# Patient Record
Sex: FEMALE | Race: WHITE
[De-identification: names, ages, dates, MRNs, and addresses within clinical notes are randomized per-mention and may not be internally consistent; named-entity substitution may affect disease eponyms.]

---

## 2018-04-14 ENCOUNTER — HOSPITAL ENCOUNTER (EMERGENCY)
Dept: HOSPITAL 94 - ER | Age: 33
Discharge: HOME | End: 2018-04-14
Payer: MEDICAID

## 2018-04-14 VITALS — WEIGHT: 189.6 LBS | HEIGHT: 63 IN | BODY MASS INDEX: 33.59 KG/M2

## 2018-04-14 VITALS — SYSTOLIC BLOOD PRESSURE: 129 MMHG | DIASTOLIC BLOOD PRESSURE: 79 MMHG
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
## 2018-04-14 DIAGNOSIS — G89.29: ICD-10-CM

## 2018-04-14 DIAGNOSIS — S16.1XXA: Primary | ICD-10-CM

## 2018-04-14 DIAGNOSIS — J45.909: ICD-10-CM
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
## 2018-04-14 DIAGNOSIS — Z88.8: ICD-10-CM

## 2018-04-14 DIAGNOSIS — Z79.899: ICD-10-CM

## 2018-04-14 DIAGNOSIS — Z87.442: ICD-10-CM

## 2018-04-14 DIAGNOSIS — S39.012A: ICD-10-CM

## 2018-04-14 PROCEDURE — 99283 EMERGENCY DEPT VISIT LOW MDM: CPT

## 2018-05-24 ENCOUNTER — HOSPITAL ENCOUNTER (INPATIENT)
Dept: HOSPITAL 94 - ER | Age: 33
LOS: 3 days | Discharge: HOME | DRG: 463 | End: 2018-05-27
Attending: FAMILY MEDICINE | Admitting: FAMILY MEDICINE
Payer: MEDICAID

## 2018-05-24 VITALS — WEIGHT: 200.62 LBS | BODY MASS INDEX: 35.55 KG/M2 | HEIGHT: 63 IN

## 2018-05-24 DIAGNOSIS — J45.909: ICD-10-CM

## 2018-05-24 DIAGNOSIS — M54.9: ICD-10-CM

## 2018-05-24 DIAGNOSIS — R00.0: ICD-10-CM

## 2018-05-24 DIAGNOSIS — B96.20: ICD-10-CM

## 2018-05-24 DIAGNOSIS — Z23: ICD-10-CM

## 2018-05-24 DIAGNOSIS — Z82.0: ICD-10-CM

## 2018-05-24 DIAGNOSIS — K76.0: ICD-10-CM

## 2018-05-24 DIAGNOSIS — E66.9: ICD-10-CM

## 2018-05-24 DIAGNOSIS — Z87.442: ICD-10-CM

## 2018-05-24 DIAGNOSIS — E28.2: ICD-10-CM

## 2018-05-24 DIAGNOSIS — N13.6: Primary | ICD-10-CM

## 2018-05-24 DIAGNOSIS — Z72.0: ICD-10-CM

## 2018-05-24 DIAGNOSIS — G89.29: ICD-10-CM

## 2018-05-24 LAB
ALBUMIN SERPL BCP-MCNC: 3.6 G/DL (ref 3.4–5)
ALBUMIN/GLOB SERPL: 0.9 {RATIO} (ref 1.1–1.5)
ALP SERPL-CCNC: 56 IU/L (ref 46–116)
ALT SERPL W P-5'-P-CCNC: 27 U/L (ref 12–78)
ANION GAP SERPL CALCULATED.3IONS-SCNC: 7 MMOL/L (ref 8–16)
AST SERPL W P-5'-P-CCNC: 12 U/L (ref 10–37)
BACTERIA URNS QL MICRO: (no result) /HPF
BASOPHILS # BLD AUTO: 0 X10'3 (ref 0–0.2)
BASOPHILS NFR BLD AUTO: 0.3 % (ref 0–1)
BILIRUB SERPL-MCNC: 0.7 MG/DL (ref 0.1–1)
BUN SERPL-MCNC: 13 MG/DL (ref 7–18)
BUN/CREAT SERPL: 14.4 (ref 6.6–38)
CALCIUM SERPL-MCNC: 8.9 MG/DL (ref 8.5–10.1)
CHLORIDE SERPL-SCNC: 104 MMOL/L (ref 99–107)
CLARITY UR: (no result)
CO2 SERPL-SCNC: 28.7 MMOL/L (ref 24–32)
COLOR UR: YELLOW
CREAT SERPL-MCNC: 0.9 MG/DL (ref 0.4–0.9)
DEPRECATED SQUAMOUS URNS QL MICRO: (no result) /LPF
EOSINOPHIL # BLD AUTO: 0.3 X10'3 (ref 0–0.9)
EOSINOPHIL NFR BLD AUTO: 1.7 % (ref 0–6)
ERYTHROCYTE [DISTWIDTH] IN BLOOD BY AUTOMATED COUNT: 12.8 % (ref 11.5–14.5)
GFR SERPL CREATININE-BSD FRML MDRD: 72 ML/MIN
GLUCOSE SERPL-MCNC: 144 MG/DL (ref 70–104)
GLUCOSE UR STRIP-MCNC: NEGATIVE MG/DL
HCG UR QL: NEGATIVE
HCT VFR BLD AUTO: 40.1 % (ref 35–45)
HGB BLD-MCNC: 13.9 G/DL (ref 12–16)
HGB UR QL STRIP: (no result)
INR PPP: 0.9 INR
KETONES UR STRIP-MCNC: NEGATIVE MG/DL
LEUKOCYTE ESTERASE UR QL STRIP: (no result)
LYMPHOCYTES # BLD AUTO: 1 X10'3 (ref 1.1–4.8)
LYMPHOCYTES NFR BLD AUTO: 5.3 % (ref 21–51)
MCH RBC QN AUTO: 30.7 PG (ref 27–31)
MCHC RBC AUTO-ENTMCNC: 34.7 % (ref 33–36.5)
MCV RBC AUTO: 88.3 FL (ref 78–98)
MONOCYTES # BLD AUTO: 1.3 X10'3 (ref 0–0.9)
MONOCYTES NFR BLD AUTO: 7.1 % (ref 2–12)
NEUTROPHILS # BLD AUTO: 15.3 X10'3 (ref 1.8–7.7)
NEUTROPHILS NFR BLD AUTO: 85.6 % (ref 42–75)
NITRITE UR QL STRIP: NEGATIVE
PH UR STRIP: 6 [PH] (ref 4.8–8)
PLATELET # BLD AUTO: 232 X10'3 (ref 140–440)
PMV BLD AUTO: 8.8 FL (ref 7.4–10.4)
POTASSIUM SERPL-SCNC: 3.6 MMOL/L (ref 3.5–5.1)
PROT SERPL-MCNC: 7.8 G/DL (ref 6.4–8.2)
PROT UR QL STRIP: 30 MG/DL
PROTHROMBIN TIME: 9.6 SECONDS (ref 9–12)
RBC # BLD AUTO: 4.54 X10'6 (ref 4.2–5.6)
RBC #/AREA URNS HPF: (no result) /HPF (ref 0–2)
SODIUM SERPL-SCNC: 140 MMOL/L (ref 135–145)
SP GR UR STRIP: 1.02 (ref 1–1.03)
URN COLLECT METHOD CLASS: (no result)
UROBILINOGEN UR STRIP-MCNC: 0.2 E.U/DL (ref 0.2–1)
WBC # BLD AUTO: 17.9 X10'3 (ref 4.5–11)
WBC #/AREA URNS HPF: (no result) /HPF (ref 0–4)

## 2018-05-24 PROCEDURE — 80053 COMPREHEN METABOLIC PANEL: CPT

## 2018-05-24 PROCEDURE — 87070 CULTURE OTHR SPECIMN AEROBIC: CPT

## 2018-05-24 PROCEDURE — 99285 EMERGENCY DEPT VISIT HI MDM: CPT

## 2018-05-24 PROCEDURE — 80048 BASIC METABOLIC PNL TOTAL CA: CPT

## 2018-05-24 PROCEDURE — 74176 CT ABD & PELVIS W/O CONTRAST: CPT

## 2018-05-24 PROCEDURE — 96365 THER/PROPH/DIAG IV INF INIT: CPT

## 2018-05-24 PROCEDURE — 85027 COMPLETE CBC AUTOMATED: CPT

## 2018-05-24 PROCEDURE — 81025 URINE PREGNANCY TEST: CPT

## 2018-05-24 PROCEDURE — 87186 SC STD MICRODIL/AGAR DIL: CPT

## 2018-05-24 PROCEDURE — 87077 CULTURE AEROBIC IDENTIFY: CPT

## 2018-05-24 PROCEDURE — 90732 PPSV23 VACC 2 YRS+ SUBQ/IM: CPT

## 2018-05-24 PROCEDURE — 36415 COLL VENOUS BLD VENIPUNCTURE: CPT

## 2018-05-24 PROCEDURE — 83735 ASSAY OF MAGNESIUM: CPT

## 2018-05-24 PROCEDURE — 96375 TX/PRO/DX INJ NEW DRUG ADDON: CPT

## 2018-05-24 PROCEDURE — 87088 URINE BACTERIA CULTURE: CPT

## 2018-05-24 PROCEDURE — 84443 ASSAY THYROID STIM HORMONE: CPT

## 2018-05-24 PROCEDURE — 85610 PROTHROMBIN TIME: CPT

## 2018-05-24 PROCEDURE — 81001 URINALYSIS AUTO W/SCOPE: CPT

## 2018-05-24 PROCEDURE — 85025 COMPLETE CBC W/AUTO DIFF WBC: CPT

## 2018-05-25 VITALS — SYSTOLIC BLOOD PRESSURE: 110 MMHG | DIASTOLIC BLOOD PRESSURE: 75 MMHG

## 2018-05-25 VITALS — DIASTOLIC BLOOD PRESSURE: 63 MMHG | SYSTOLIC BLOOD PRESSURE: 108 MMHG

## 2018-05-25 VITALS — SYSTOLIC BLOOD PRESSURE: 100 MMHG | DIASTOLIC BLOOD PRESSURE: 50 MMHG

## 2018-05-25 VITALS — DIASTOLIC BLOOD PRESSURE: 53 MMHG | SYSTOLIC BLOOD PRESSURE: 101 MMHG

## 2018-05-25 LAB
ALBUMIN SERPL BCP-MCNC: 2.9 G/DL (ref 3.4–5)
ANION GAP SERPL CALCULATED.3IONS-SCNC: 11 MMOL/L (ref 8–16)
BUN SERPL-MCNC: 14 MG/DL (ref 7–18)
BUN/CREAT SERPL: 15.7 (ref 6.6–38)
CALCIUM SERPL-MCNC: 8.2 MG/DL (ref 8.5–10.1)
CHLORIDE SERPL-SCNC: 105 MMOL/L (ref 99–107)
CO2 SERPL-SCNC: 25.6 MMOL/L (ref 24–32)
CREAT SERPL-MCNC: 0.89 MG/DL (ref 0.4–0.9)
ERYTHROCYTE [DISTWIDTH] IN BLOOD BY AUTOMATED COUNT: 13.1 % (ref 11.5–14.5)
GFR SERPL CREATININE-BSD FRML MDRD: 73 ML/MIN
GLUCOSE SERPL-MCNC: 119 MG/DL (ref 70–104)
HCT VFR BLD AUTO: 34.4 % (ref 35–45)
HGB BLD-MCNC: 11.9 G/DL (ref 12–16)
MAGNESIUM SERPL-MCNC: 1.8 MG/DL (ref 1.5–2.4)
MCH RBC QN AUTO: 30.4 PG (ref 27–31)
MCHC RBC AUTO-ENTMCNC: 34.7 % (ref 33–36.5)
MCV RBC AUTO: 87.6 FL (ref 78–98)
PLATELET # BLD AUTO: 196 X10'3 (ref 140–440)
PMV BLD AUTO: 9.3 FL (ref 7.4–10.4)
POTASSIUM SERPL-SCNC: 3.7 MMOL/L (ref 3.5–5.1)
RBC # BLD AUTO: 3.93 X10'6 (ref 4.2–5.6)
SODIUM SERPL-SCNC: 142 MMOL/L (ref 135–145)
WBC # BLD AUTO: 16.6 X10'3 (ref 4.5–11)

## 2018-05-25 RX ADMIN — SODIUM CHLORIDE SCH MLS/HR: 9 INJECTION INTRAMUSCULAR; INTRAVENOUS; SUBCUTANEOUS at 00:11

## 2018-05-25 RX ADMIN — SODIUM CHLORIDE SCH MLS/HR: 9 INJECTION INTRAMUSCULAR; INTRAVENOUS; SUBCUTANEOUS at 07:34

## 2018-05-25 RX ADMIN — SODIUM CHLORIDE SCH MLS/HR: 9 INJECTION INTRAMUSCULAR; INTRAVENOUS; SUBCUTANEOUS at 23:45

## 2018-05-25 RX ADMIN — SODIUM CHLORIDE SCH MLS/HR: 9 INJECTION INTRAMUSCULAR; INTRAVENOUS; SUBCUTANEOUS at 21:11

## 2018-05-25 RX ADMIN — HYDROCODONE BITARTRATE AND ACETAMINOPHEN PRN TAB: 10; 325 TABLET ORAL at 21:15

## 2018-05-25 RX ADMIN — Medication SCH MMU: at 21:09

## 2018-05-26 VITALS — SYSTOLIC BLOOD PRESSURE: 102 MMHG | DIASTOLIC BLOOD PRESSURE: 65 MMHG

## 2018-05-26 VITALS — DIASTOLIC BLOOD PRESSURE: 70 MMHG | SYSTOLIC BLOOD PRESSURE: 104 MMHG

## 2018-05-26 VITALS — DIASTOLIC BLOOD PRESSURE: 65 MMHG | SYSTOLIC BLOOD PRESSURE: 103 MMHG

## 2018-05-26 VITALS — SYSTOLIC BLOOD PRESSURE: 108 MMHG | DIASTOLIC BLOOD PRESSURE: 74 MMHG

## 2018-05-26 LAB
ALBUMIN SERPL BCP-MCNC: 2.6 G/DL (ref 3.4–5)
ANION GAP SERPL CALCULATED.3IONS-SCNC: 8 MMOL/L (ref 8–16)
BUN SERPL-MCNC: 7 MG/DL (ref 7–18)
BUN/CREAT SERPL: 9 (ref 6.6–38)
CALCIUM SERPL-MCNC: 8.3 MG/DL (ref 8.5–10.1)
CHLORIDE SERPL-SCNC: 105 MMOL/L (ref 99–107)
CO2 SERPL-SCNC: 25.1 MMOL/L (ref 24–32)
CREAT SERPL-MCNC: 0.78 MG/DL (ref 0.4–0.9)
ERYTHROCYTE [DISTWIDTH] IN BLOOD BY AUTOMATED COUNT: 12.8 % (ref 11.5–14.5)
GFR SERPL CREATININE-BSD FRML MDRD: 85 ML/MIN
GLUCOSE SERPL-MCNC: 117 MG/DL (ref 70–104)
HCT VFR BLD AUTO: 32.8 % (ref 35–45)
HGB BLD-MCNC: 11.2 G/DL (ref 12–16)
MAGNESIUM SERPL-MCNC: 1.8 MG/DL (ref 1.5–2.4)
MCH RBC QN AUTO: 30 PG (ref 27–31)
MCHC RBC AUTO-ENTMCNC: 34.2 % (ref 33–36.5)
MCV RBC AUTO: 87.9 FL (ref 78–98)
PLATELET # BLD AUTO: 194 X10'3 (ref 140–440)
PMV BLD AUTO: 9.2 FL (ref 7.4–10.4)
POTASSIUM SERPL-SCNC: 3.7 MMOL/L (ref 3.5–5.1)
RBC # BLD AUTO: 3.73 X10'6 (ref 4.2–5.6)
SODIUM SERPL-SCNC: 138 MMOL/L (ref 135–145)
WBC # BLD AUTO: 13.5 X10'3 (ref 4.5–11)

## 2018-05-26 RX ADMIN — SODIUM CHLORIDE SCH MLS/HR: 9 INJECTION INTRAMUSCULAR; INTRAVENOUS; SUBCUTANEOUS at 15:33

## 2018-05-26 RX ADMIN — SODIUM CHLORIDE SCH MLS/HR: 9 INJECTION INTRAMUSCULAR; INTRAVENOUS; SUBCUTANEOUS at 05:36

## 2018-05-26 RX ADMIN — HYDROCODONE BITARTRATE AND ACETAMINOPHEN PRN TAB: 10; 325 TABLET ORAL at 05:36

## 2018-05-26 RX ADMIN — Medication SCH MMU: at 20:40

## 2018-05-26 RX ADMIN — SODIUM CHLORIDE SCH MLS/HR: 9 INJECTION INTRAMUSCULAR; INTRAVENOUS; SUBCUTANEOUS at 23:43

## 2018-05-26 RX ADMIN — Medication SCH MMU: at 08:26

## 2018-05-27 VITALS — SYSTOLIC BLOOD PRESSURE: 116 MMHG | DIASTOLIC BLOOD PRESSURE: 66 MMHG

## 2018-05-27 VITALS — SYSTOLIC BLOOD PRESSURE: 110 MMHG | DIASTOLIC BLOOD PRESSURE: 62 MMHG

## 2018-05-27 VITALS — SYSTOLIC BLOOD PRESSURE: 107 MMHG | DIASTOLIC BLOOD PRESSURE: 71 MMHG

## 2018-05-27 LAB
ALBUMIN SERPL BCP-MCNC: 2.5 G/DL (ref 3.4–5)
ANION GAP SERPL CALCULATED.3IONS-SCNC: 7 MMOL/L (ref 8–16)
BASOPHILS # BLD AUTO: 0 X10'3 (ref 0–0.2)
BASOPHILS NFR BLD AUTO: 0.3 % (ref 0–1)
BUN SERPL-MCNC: 6 MG/DL (ref 7–18)
BUN/CREAT SERPL: 8.3 (ref 6.6–38)
CALCIUM SERPL-MCNC: 8.1 MG/DL (ref 8.5–10.1)
CHLORIDE SERPL-SCNC: 106 MMOL/L (ref 99–107)
CO2 SERPL-SCNC: 26.4 MMOL/L (ref 24–32)
CREAT SERPL-MCNC: 0.72 MG/DL (ref 0.4–0.9)
EOSINOPHIL # BLD AUTO: 0.2 X10'3 (ref 0–0.9)
EOSINOPHIL NFR BLD AUTO: 2.2 % (ref 0–6)
ERYTHROCYTE [DISTWIDTH] IN BLOOD BY AUTOMATED COUNT: 12.9 % (ref 11.5–14.5)
GFR SERPL CREATININE-BSD FRML MDRD: > 90 ML/MIN
GLUCOSE SERPL-MCNC: 88 MG/DL (ref 70–104)
HCT VFR BLD AUTO: 31.7 % (ref 35–45)
HGB BLD-MCNC: 10.9 G/DL (ref 12–16)
LYMPHOCYTES # BLD AUTO: 2.1 X10'3 (ref 1.1–4.8)
LYMPHOCYTES NFR BLD AUTO: 24.9 % (ref 21–51)
MAGNESIUM SERPL-MCNC: 2 MG/DL (ref 1.5–2.4)
MCH RBC QN AUTO: 30.2 PG (ref 27–31)
MCHC RBC AUTO-ENTMCNC: 34.3 % (ref 33–36.5)
MCV RBC AUTO: 88.2 FL (ref 78–98)
MONOCYTES # BLD AUTO: 1 X10'3 (ref 0–0.9)
MONOCYTES NFR BLD AUTO: 11.8 % (ref 2–12)
NEUTROPHILS # BLD AUTO: 5.2 X10'3 (ref 1.8–7.7)
NEUTROPHILS NFR BLD AUTO: 60.8 % (ref 42–75)
PLATELET # BLD AUTO: 211 X10'3 (ref 140–440)
PMV BLD AUTO: 8.9 FL (ref 7.4–10.4)
POTASSIUM SERPL-SCNC: 3.9 MMOL/L (ref 3.5–5.1)
RBC # BLD AUTO: 3.59 X10'6 (ref 4.2–5.6)
SODIUM SERPL-SCNC: 139 MMOL/L (ref 135–145)
WBC # BLD AUTO: 8.6 X10'3 (ref 4.5–11)

## 2018-05-27 RX ADMIN — Medication SCH MMU: at 08:28

## 2018-05-27 RX ADMIN — SODIUM CHLORIDE SCH MLS/HR: 9 INJECTION INTRAMUSCULAR; INTRAVENOUS; SUBCUTANEOUS at 15:45

## 2018-05-27 RX ADMIN — SODIUM CHLORIDE SCH MLS/HR: 9 INJECTION INTRAMUSCULAR; INTRAVENOUS; SUBCUTANEOUS at 08:28

## 2018-08-23 ENCOUNTER — HOSPITAL ENCOUNTER (EMERGENCY)
Dept: HOSPITAL 94 - ER | Age: 33
Discharge: HOME | End: 2018-08-23
Payer: MEDICAID

## 2018-08-23 VITALS — WEIGHT: 212.31 LBS | BODY MASS INDEX: 37.62 KG/M2 | HEIGHT: 63 IN

## 2018-08-23 VITALS — DIASTOLIC BLOOD PRESSURE: 89 MMHG | SYSTOLIC BLOOD PRESSURE: 124 MMHG

## 2018-08-23 DIAGNOSIS — Z87.442: ICD-10-CM

## 2018-08-23 DIAGNOSIS — R19.7: Primary | ICD-10-CM

## 2018-08-23 DIAGNOSIS — J45.909: ICD-10-CM

## 2018-08-23 DIAGNOSIS — G89.29: ICD-10-CM

## 2018-08-23 DIAGNOSIS — R10.84: ICD-10-CM

## 2018-08-23 PROCEDURE — 99283 EMERGENCY DEPT VISIT LOW MDM: CPT

## 2018-10-01 ENCOUNTER — HOSPITAL ENCOUNTER (EMERGENCY)
Dept: HOSPITAL 94 - ER | Age: 33
Discharge: HOME | End: 2018-10-01
Payer: MEDICAID

## 2018-10-01 VITALS — BODY MASS INDEX: 38.18 KG/M2 | HEIGHT: 63 IN | WEIGHT: 215.46 LBS

## 2018-10-01 VITALS — DIASTOLIC BLOOD PRESSURE: 86 MMHG | SYSTOLIC BLOOD PRESSURE: 154 MMHG

## 2018-10-01 DIAGNOSIS — G89.29: ICD-10-CM

## 2018-10-01 DIAGNOSIS — Z79.899: ICD-10-CM

## 2018-10-01 DIAGNOSIS — R10.2: Primary | ICD-10-CM

## 2018-10-01 DIAGNOSIS — J45.909: ICD-10-CM

## 2018-10-01 DIAGNOSIS — Z88.5: ICD-10-CM

## 2018-10-01 LAB
ALBUMIN SERPL BCP-MCNC: 4 G/DL (ref 3.4–5)
ALBUMIN/GLOB SERPL: 1 {RATIO} (ref 1.1–1.5)
ALP SERPL-CCNC: 57 IU/L (ref 46–116)
ALT SERPL W P-5'-P-CCNC: 27 U/L (ref 12–78)
ANION GAP SERPL CALCULATED.3IONS-SCNC: 10 MMOL/L (ref 8–16)
AST SERPL W P-5'-P-CCNC: 18 U/L (ref 10–37)
BASOPHILS # BLD AUTO: 0 X10'3 (ref 0–0.2)
BASOPHILS NFR BLD AUTO: 0.5 % (ref 0–1)
BILIRUB SERPL-MCNC: 0.3 MG/DL (ref 0.1–1)
BUN SERPL-MCNC: 16 MG/DL (ref 7–18)
BUN/CREAT SERPL: 17.4 (ref 6.6–38)
CALCIUM SERPL-MCNC: 9.2 MG/DL (ref 8.5–10.1)
CHLORIDE SERPL-SCNC: 101 MMOL/L (ref 99–107)
CLARITY UR: CLEAR
CO2 SERPL-SCNC: 30.7 MMOL/L (ref 24–32)
COLOR UR: YELLOW
CREAT SERPL-MCNC: 0.92 MG/DL (ref 0.4–0.9)
EOSINOPHIL # BLD AUTO: 0.2 X10'3 (ref 0–0.9)
EOSINOPHIL NFR BLD AUTO: 3 % (ref 0–6)
ERYTHROCYTE [DISTWIDTH] IN BLOOD BY AUTOMATED COUNT: 12.2 % (ref 11.5–14.5)
GFR SERPL CREATININE-BSD FRML MDRD: 70 ML/MIN
GLUCOSE SERPL-MCNC: 89 MG/DL (ref 70–104)
GLUCOSE UR STRIP-MCNC: NEGATIVE MG/DL
HCG UR QL: NEGATIVE
HCT VFR BLD AUTO: 38.3 % (ref 35–45)
HGB BLD-MCNC: 12.8 G/DL (ref 12–16)
HGB UR QL STRIP: NEGATIVE
KETONES UR STRIP-MCNC: NEGATIVE MG/DL
LEUKOCYTE ESTERASE UR QL STRIP: NEGATIVE
LIPASE SERPL-CCNC: 245 U/L (ref 73–393)
LYMPHOCYTES # BLD AUTO: 2.2 X10'3 (ref 1.1–4.8)
LYMPHOCYTES NFR BLD AUTO: 32.4 % (ref 21–51)
MCH RBC QN AUTO: 30 PG (ref 27–31)
MCHC RBC AUTO-ENTMCNC: 33.5 % (ref 33–36.5)
MCV RBC AUTO: 89.8 FL (ref 78–98)
MONOCYTES # BLD AUTO: 0.7 X10'3 (ref 0–0.9)
MONOCYTES NFR BLD AUTO: 10.4 % (ref 2–12)
NEUTROPHILS # BLD AUTO: 3.6 X10'3 (ref 1.8–7.7)
NEUTROPHILS NFR BLD AUTO: 53.7 % (ref 42–75)
NITRITE UR QL STRIP: NEGATIVE
PH UR STRIP: 6 [PH] (ref 4.8–8)
PLATELET # BLD AUTO: 325 X10'3 (ref 140–440)
PMV BLD AUTO: 8.8 FL (ref 7.4–10.4)
POTASSIUM SERPL-SCNC: 3.8 MMOL/L (ref 3.5–5.1)
PROT SERPL-MCNC: 7.9 G/DL (ref 6.4–8.2)
PROT UR QL STRIP: NEGATIVE MG/DL
RBC # BLD AUTO: 4.26 X10'6 (ref 4.2–5.6)
SODIUM SERPL-SCNC: 142 MMOL/L (ref 135–145)
SP GR UR STRIP: <=1.005 (ref 1–1.03)
URN COLLECT METHOD CLASS: (no result)
UROBILINOGEN UR STRIP-MCNC: 0.2 E.U/DL (ref 0.2–1)
WBC # BLD AUTO: 6.7 X10'3 (ref 4.5–11)

## 2018-10-01 PROCEDURE — 85025 COMPLETE CBC W/AUTO DIFF WBC: CPT

## 2018-10-01 PROCEDURE — 99285 EMERGENCY DEPT VISIT HI MDM: CPT

## 2018-10-01 PROCEDURE — 81025 URINE PREGNANCY TEST: CPT

## 2018-10-01 PROCEDURE — 96375 TX/PRO/DX INJ NEW DRUG ADDON: CPT

## 2018-10-01 PROCEDURE — 80053 COMPREHEN METABOLIC PANEL: CPT

## 2018-10-01 PROCEDURE — 81003 URINALYSIS AUTO W/O SCOPE: CPT

## 2018-10-01 PROCEDURE — 96374 THER/PROPH/DIAG INJ IV PUSH: CPT

## 2018-10-01 PROCEDURE — 83690 ASSAY OF LIPASE: CPT

## 2018-10-01 PROCEDURE — 76856 US EXAM PELVIC COMPLETE: CPT

## 2018-10-01 PROCEDURE — 96361 HYDRATE IV INFUSION ADD-ON: CPT

## 2018-10-01 PROCEDURE — 36415 COLL VENOUS BLD VENIPUNCTURE: CPT

## 2019-07-09 ENCOUNTER — HOSPITAL ENCOUNTER (EMERGENCY)
Dept: HOSPITAL 94 - ER | Age: 34
Discharge: HOME | End: 2019-07-09
Payer: COMMERCIAL

## 2019-07-09 VITALS — SYSTOLIC BLOOD PRESSURE: 123 MMHG | DIASTOLIC BLOOD PRESSURE: 90 MMHG

## 2019-07-09 VITALS — BODY MASS INDEX: 40.62 KG/M2 | WEIGHT: 229.28 LBS | HEIGHT: 63 IN

## 2019-07-09 DIAGNOSIS — J45.909: ICD-10-CM

## 2019-07-09 DIAGNOSIS — K08.89: Primary | ICD-10-CM

## 2019-07-09 DIAGNOSIS — G89.29: ICD-10-CM

## 2019-07-09 DIAGNOSIS — G43.909: ICD-10-CM

## 2019-07-09 DIAGNOSIS — Z79.899: ICD-10-CM

## 2019-07-09 DIAGNOSIS — Z88.6: ICD-10-CM

## 2019-07-09 PROCEDURE — 96372 THER/PROPH/DIAG INJ SC/IM: CPT

## 2019-07-09 PROCEDURE — 99283 EMERGENCY DEPT VISIT LOW MDM: CPT

## 2019-09-27 ENCOUNTER — HOSPITAL ENCOUNTER (EMERGENCY)
Dept: HOSPITAL 94 - ER | Age: 34
Discharge: HOME | End: 2019-09-27
Payer: MEDICAID

## 2019-09-27 VITALS — HEIGHT: 63 IN | BODY MASS INDEX: 39.42 KG/M2 | WEIGHT: 222.47 LBS

## 2019-09-27 VITALS — SYSTOLIC BLOOD PRESSURE: 126 MMHG | DIASTOLIC BLOOD PRESSURE: 82 MMHG

## 2019-09-27 DIAGNOSIS — Z87.442: ICD-10-CM

## 2019-09-27 DIAGNOSIS — G43.909: Primary | ICD-10-CM

## 2019-09-27 DIAGNOSIS — Z79.899: ICD-10-CM

## 2019-09-27 DIAGNOSIS — Z88.6: ICD-10-CM

## 2019-09-27 DIAGNOSIS — G89.29: ICD-10-CM

## 2019-09-27 DIAGNOSIS — Z20.811: ICD-10-CM

## 2019-09-27 DIAGNOSIS — J45.909: ICD-10-CM

## 2019-09-27 PROCEDURE — 99281 EMR DPT VST MAYX REQ PHY/QHP: CPT

## 2020-04-02 ENCOUNTER — HOSPITAL ENCOUNTER (EMERGENCY)
Dept: HOSPITAL 94 - ER | Age: 35
Discharge: HOME | End: 2020-04-02
Payer: MEDICAID

## 2020-04-02 VITALS — SYSTOLIC BLOOD PRESSURE: 113 MMHG | DIASTOLIC BLOOD PRESSURE: 72 MMHG

## 2020-04-02 VITALS — HEIGHT: 63 IN | WEIGHT: 213.85 LBS | BODY MASS INDEX: 37.89 KG/M2

## 2020-04-02 DIAGNOSIS — Z88.5: ICD-10-CM

## 2020-04-02 DIAGNOSIS — Z79.899: ICD-10-CM

## 2020-04-02 DIAGNOSIS — R11.2: ICD-10-CM

## 2020-04-02 DIAGNOSIS — G89.29: ICD-10-CM

## 2020-04-02 DIAGNOSIS — J45.909: ICD-10-CM

## 2020-04-02 DIAGNOSIS — K80.20: Primary | ICD-10-CM

## 2020-04-02 DIAGNOSIS — F17.200: ICD-10-CM

## 2020-04-02 LAB
ALBUMIN SERPL BCP-MCNC: 4.2 G/DL (ref 3.4–5)
ALBUMIN/GLOB SERPL: 1 {RATIO} (ref 1.1–1.5)
ALP SERPL-CCNC: 65 IU/L (ref 46–116)
ALT SERPL W P-5'-P-CCNC: 40 U/L (ref 12–78)
ANION GAP SERPL CALCULATED.3IONS-SCNC: 6 MMOL/L (ref 8–16)
AST SERPL W P-5'-P-CCNC: 22 U/L (ref 10–37)
BASOPHILS # BLD AUTO: (no result) X10'3 (ref 0–0.2)
BASOPHILS NFR BLD AUTO: (no result) % (ref 0–1)
BASOPHILS NFR BLD MANUAL: 1 % (ref 0–1)
BILIRUB SERPL-MCNC: 0.3 MG/DL (ref 0.1–1)
BUN SERPL-MCNC: 14 MG/DL (ref 7–18)
BUN/CREAT SERPL: 14.3 (ref 6.6–38)
CALCIUM SERPL-MCNC: 8.8 MG/DL (ref 8.5–10.1)
CHLORIDE SERPL-SCNC: 106 MMOL/L (ref 99–107)
CLARITY UR: CLEAR
CO2 SERPL-SCNC: 31.3 MMOL/L (ref 24–32)
COLOR UR: YELLOW
CREAT SERPL-MCNC: 0.98 MG/DL (ref 0.4–0.9)
EOSINOPHIL # BLD AUTO: (no result) X10'3 (ref 0–0.9)
EOSINOPHIL NFR BLD AUTO: (no result) % (ref 0–6)
EOSINOPHIL NFR BLD MANUAL: 1 % (ref 0–6)
ERYTHROCYTE [DISTWIDTH] IN BLOOD BY AUTOMATED COUNT: 13.3 % (ref 11.5–14.5)
GFR SERPL CREATININE-BSD FRML MDRD: 65 ML/MIN
GLUCOSE SERPL-MCNC: 113 MG/DL (ref 70–104)
GLUCOSE UR STRIP-MCNC: NEGATIVE MG/DL
HCG UR QL: NEGATIVE
HCT VFR BLD AUTO: 45.7 % (ref 35–45)
HGB BLD-MCNC: 15.3 G/DL (ref 12–16)
HGB UR QL STRIP: NEGATIVE
KETONES UR STRIP-MCNC: NEGATIVE MG/DL
LEUKOCYTE ESTERASE UR QL STRIP: NEGATIVE
LIPASE SERPL-CCNC: 310 U/L (ref 73–393)
LYMPHOCYTES # BLD AUTO: (no result) X10'3 (ref 1.1–4.8)
LYMPHOCYTES NFR BLD AUTO: (no result) % (ref 21–51)
LYMPHOCYTES NFR BLD MANUAL: 42 % (ref 21–51)
MCH RBC QN AUTO: 30.6 PG (ref 27–31)
MCHC RBC AUTO-ENTMCNC: 33.6 G/DL (ref 33–36.5)
MCV RBC AUTO: 91.1 FL (ref 78–98)
MONOCYTES # BLD AUTO: (no result) X10'3 (ref 0–0.9)
MONOCYTES NFR BLD AUTO: (no result) % (ref 2–12)
MONOCYTES NFR BLD MANUAL: 6 % (ref 2–12)
NEUTROPHILS # BLD AUTO: (no result) X10'3 (ref 1.8–7.7)
NEUTROPHILS NFR BLD AUTO: (no result) % (ref 42–75)
NEUTS SEG NFR BLD MANUAL: 49 % (ref 42–75)
NITRITE UR QL STRIP: NEGATIVE
PH UR STRIP: 6 [PH] (ref 4.8–8)
PLATELET # BLD AUTO: 244 X10'3 (ref 140–440)
PLATELET BLD QL SMEAR: NORMAL
PMV BLD AUTO: 9.4 FL (ref 7.4–10.4)
POTASSIUM SERPL-SCNC: 3.8 MMOL/L (ref 3.5–5.1)
PROT SERPL-MCNC: 8.3 G/DL (ref 6.4–8.2)
PROT UR QL STRIP: NEGATIVE MG/DL
RBC # BLD AUTO: 5.01 X10'6 (ref 4.2–5.6)
RBC MORPH BLD: NORMAL
SODIUM SERPL-SCNC: 143 MMOL/L (ref 135–145)
SP GR UR STRIP: >=1.03 (ref 1–1.03)
TOTAL CELLS COUNTED FLD: 100
URN COLLECT METHOD CLASS: (no result)
UROBILINOGEN UR STRIP-MCNC: 0.2 E.U/DL (ref 0.2–1)
VARIANT LYMPHS NFR BLD MANUAL: 1 % (ref 0–0)
WBC # BLD AUTO: 9.8 X10'3 (ref 4.5–11)

## 2020-04-02 PROCEDURE — 96375 TX/PRO/DX INJ NEW DRUG ADDON: CPT

## 2020-04-02 PROCEDURE — 96374 THER/PROPH/DIAG INJ IV PUSH: CPT

## 2020-04-02 PROCEDURE — 81003 URINALYSIS AUTO W/O SCOPE: CPT

## 2020-04-02 PROCEDURE — 36415 COLL VENOUS BLD VENIPUNCTURE: CPT

## 2020-04-02 PROCEDURE — 83690 ASSAY OF LIPASE: CPT

## 2020-04-02 PROCEDURE — 74176 CT ABD & PELVIS W/O CONTRAST: CPT

## 2020-04-02 PROCEDURE — 81025 URINE PREGNANCY TEST: CPT

## 2020-04-02 PROCEDURE — 85025 COMPLETE CBC W/AUTO DIFF WBC: CPT

## 2020-04-02 PROCEDURE — 80053 COMPREHEN METABOLIC PANEL: CPT

## 2020-04-02 PROCEDURE — 96361 HYDRATE IV INFUSION ADD-ON: CPT

## 2020-04-02 PROCEDURE — 99284 EMERGENCY DEPT VISIT MOD MDM: CPT

## 2021-03-22 ENCOUNTER — HOSPITAL ENCOUNTER (EMERGENCY)
Dept: HOSPITAL 94 - ER | Age: 36
Discharge: HOME | End: 2021-03-22
Payer: MEDICAID

## 2021-03-22 VITALS — SYSTOLIC BLOOD PRESSURE: 130 MMHG | DIASTOLIC BLOOD PRESSURE: 88 MMHG

## 2021-03-22 VITALS — WEIGHT: 239.53 LBS | BODY MASS INDEX: 42.44 KG/M2 | HEIGHT: 63 IN

## 2021-03-22 DIAGNOSIS — G43.909: ICD-10-CM

## 2021-03-22 DIAGNOSIS — Z87.442: ICD-10-CM

## 2021-03-22 DIAGNOSIS — J02.9: ICD-10-CM

## 2021-03-22 DIAGNOSIS — J06.9: Primary | ICD-10-CM

## 2021-03-22 DIAGNOSIS — G89.29: ICD-10-CM

## 2021-03-22 DIAGNOSIS — Z79.899: ICD-10-CM

## 2021-03-22 DIAGNOSIS — R05: ICD-10-CM

## 2021-03-22 DIAGNOSIS — F17.200: ICD-10-CM

## 2021-03-22 DIAGNOSIS — J45.909: ICD-10-CM

## 2021-03-22 DIAGNOSIS — Z88.6: ICD-10-CM

## 2021-03-22 PROCEDURE — 99283 EMERGENCY DEPT VISIT LOW MDM: CPT

## 2021-03-22 PROCEDURE — 87880 STREP A ASSAY W/OPTIC: CPT

## 2021-03-22 PROCEDURE — 87081 CULTURE SCREEN ONLY: CPT

## 2021-06-01 ENCOUNTER — HOSPITAL ENCOUNTER (EMERGENCY)
Dept: HOSPITAL 94 - ER | Age: 36
Discharge: HOME | End: 2021-06-01
Payer: MEDICAID

## 2021-06-01 VITALS — BODY MASS INDEX: 41.41 KG/M2 | WEIGHT: 233.69 LBS | HEIGHT: 63 IN

## 2021-06-01 VITALS — DIASTOLIC BLOOD PRESSURE: 90 MMHG | SYSTOLIC BLOOD PRESSURE: 122 MMHG

## 2021-06-01 DIAGNOSIS — J45.909: Primary | ICD-10-CM

## 2021-06-01 DIAGNOSIS — Z20.822: ICD-10-CM

## 2021-06-01 DIAGNOSIS — Z87.442: ICD-10-CM

## 2021-06-01 DIAGNOSIS — G89.29: ICD-10-CM

## 2021-06-01 DIAGNOSIS — R06.02: ICD-10-CM

## 2021-06-01 DIAGNOSIS — Z79.899: ICD-10-CM

## 2021-06-01 DIAGNOSIS — Z88.6: ICD-10-CM

## 2021-06-01 DIAGNOSIS — R05: ICD-10-CM

## 2021-06-01 DIAGNOSIS — G43.909: ICD-10-CM

## 2021-06-01 PROCEDURE — 87635 SARS-COV-2 COVID-19 AMP PRB: CPT

## 2021-06-01 PROCEDURE — 99283 EMERGENCY DEPT VISIT LOW MDM: CPT

## 2022-04-23 ENCOUNTER — HOSPITAL ENCOUNTER (INPATIENT)
Dept: HOSPITAL 94 - ER | Age: 37
LOS: 3 days | Discharge: HOME | DRG: 419 | End: 2022-04-26
Attending: INTERNAL MEDICINE | Admitting: INTERNAL MEDICINE
Payer: COMMERCIAL

## 2022-04-23 VITALS — HEIGHT: 63 IN | WEIGHT: 250.53 LBS | BODY MASS INDEX: 44.39 KG/M2

## 2022-04-23 DIAGNOSIS — Z87.11: ICD-10-CM

## 2022-04-23 DIAGNOSIS — G43.909: ICD-10-CM

## 2022-04-23 DIAGNOSIS — J45.909: ICD-10-CM

## 2022-04-23 DIAGNOSIS — E28.2: ICD-10-CM

## 2022-04-23 DIAGNOSIS — Z79.899: ICD-10-CM

## 2022-04-23 DIAGNOSIS — E87.6: ICD-10-CM

## 2022-04-23 DIAGNOSIS — K80.00: Primary | ICD-10-CM

## 2022-04-23 DIAGNOSIS — Z20.822: ICD-10-CM

## 2022-04-23 DIAGNOSIS — Z88.6: ICD-10-CM

## 2022-04-23 DIAGNOSIS — G89.29: ICD-10-CM

## 2022-04-23 DIAGNOSIS — Z87.442: ICD-10-CM

## 2022-04-23 LAB
ALBUMIN SERPL BCP-MCNC: 4 G/DL (ref 3.4–5)
ALBUMIN/GLOB SERPL: 1 {RATIO} (ref 1.1–1.5)
ALP SERPL-CCNC: 69 IU/L (ref 46–116)
ALT SERPL W P-5'-P-CCNC: 76 U/L (ref 12–78)
ANION GAP SERPL CALCULATED.3IONS-SCNC: 9 MMOL/L (ref 8–16)
AST SERPL W P-5'-P-CCNC: 26 U/L (ref 10–37)
BACTERIA URNS QL MICRO: (no result) /HPF
BASOPHILS # BLD AUTO: 0 X10'3 (ref 0–0.2)
BASOPHILS NFR BLD AUTO: 0.3 % (ref 0–1)
BILIRUB SERPL-MCNC: 0.3 MG/DL (ref 0.1–1)
BUN SERPL-MCNC: 11 MG/DL (ref 7–18)
BUN/CREAT SERPL: 12.8 (ref 6.6–38)
CALCIUM SERPL-MCNC: 8.6 MG/DL (ref 8.5–10.1)
CAOX CRY URNS QL MICRO: (no result) /HPF
CHLORIDE SERPL-SCNC: 102 MMOL/L (ref 99–107)
CLARITY UR: (no result)
CO2 SERPL-SCNC: 29.2 MMOL/L (ref 24–32)
COLOR UR: YELLOW
CREAT SERPL-MCNC: 0.86 MG/DL (ref 0.4–0.9)
DEPRECATED SQUAMOUS URNS QL MICRO: (no result) /LPF
EOSINOPHIL # BLD AUTO: 0.4 X10'3 (ref 0–0.9)
EOSINOPHIL NFR BLD AUTO: 3.9 % (ref 0–6)
ERYTHROCYTE [DISTWIDTH] IN BLOOD BY AUTOMATED COUNT: 13.6 % (ref 11.5–14.5)
GFR SERPL CREATININE-BSD FRML MDRD: 74 ML/MIN
GLUCOSE SERPL-MCNC: 121 MG/DL (ref 70–104)
GLUCOSE UR STRIP-MCNC: NEGATIVE MG/DL
HCG UR QL: NEGATIVE
HCT VFR BLD AUTO: 42.7 % (ref 35–45)
HGB BLD-MCNC: 14.6 G/DL (ref 12–16)
HGB UR QL STRIP: NEGATIVE
KETONES UR STRIP-MCNC: NEGATIVE MG/DL
LEUKOCYTE ESTERASE UR QL STRIP: NEGATIVE
LIPASE SERPL-CCNC: 291 U/L (ref 73–393)
LYMPHOCYTES # BLD AUTO: 3.4 X10'3 (ref 1.1–4.8)
LYMPHOCYTES NFR BLD AUTO: 37.6 % (ref 21–51)
MCH RBC QN AUTO: 30.3 PG (ref 27–31)
MCHC RBC AUTO-ENTMCNC: 34.2 G/DL (ref 33–36.5)
MCV RBC AUTO: 88.4 FL (ref 78–98)
MONOCYTES # BLD AUTO: 0.8 X10'3 (ref 0–0.9)
MONOCYTES NFR BLD AUTO: 9.2 % (ref 2–12)
MUCOUS THREADS URNS QL MICRO: (no result) /LPF
NEUTROPHILS # BLD AUTO: 4.5 X10'3 (ref 1.8–7.7)
NEUTROPHILS NFR BLD AUTO: 49 % (ref 42–75)
NITRITE UR QL STRIP: NEGATIVE
PH UR STRIP: 7 [PH] (ref 4.8–8)
PLATELET # BLD AUTO: 263 X10'3 (ref 140–440)
PMV BLD AUTO: 9.5 FL (ref 7.4–10.4)
POTASSIUM SERPL-SCNC: 3.4 MMOL/L (ref 3.5–5.1)
PROT SERPL-MCNC: 7.9 G/DL (ref 6.4–8.2)
PROT UR QL STRIP: NEGATIVE MG/DL
RBC # BLD AUTO: 4.83 X10'6 (ref 4.2–5.6)
RBC #/AREA URNS HPF: (no result) /HPF (ref 0–2)
SODIUM SERPL-SCNC: 140 MMOL/L (ref 135–145)
SP GR UR STRIP: 1.02 (ref 1–1.03)
URN COLLECT METHOD CLASS: (no result)
UROBILINOGEN UR STRIP-MCNC: 0.2 E.U/DL (ref 0.2–1)
WBC # BLD AUTO: 9.1 X10'3 (ref 4.5–11)
WBC #/AREA URNS HPF: (no result) /HPF (ref 0–4)

## 2022-04-23 PROCEDURE — 84484 ASSAY OF TROPONIN QUANT: CPT

## 2022-04-23 PROCEDURE — 84132 ASSAY OF SERUM POTASSIUM: CPT

## 2022-04-23 PROCEDURE — 83605 ASSAY OF LACTIC ACID: CPT

## 2022-04-23 PROCEDURE — 71045 X-RAY EXAM CHEST 1 VIEW: CPT

## 2022-04-23 PROCEDURE — 87040 BLOOD CULTURE FOR BACTERIA: CPT

## 2022-04-23 PROCEDURE — 87081 CULTURE SCREEN ONLY: CPT

## 2022-04-23 PROCEDURE — 99285 EMERGENCY DEPT VISIT HI MDM: CPT

## 2022-04-23 PROCEDURE — 36415 COLL VENOUS BLD VENIPUNCTURE: CPT

## 2022-04-23 PROCEDURE — 87635 SARS-COV-2 COVID-19 AMP PRB: CPT

## 2022-04-23 PROCEDURE — 93005 ELECTROCARDIOGRAM TRACING: CPT

## 2022-04-23 PROCEDURE — 84145 PROCALCITONIN (PCT): CPT

## 2022-04-23 PROCEDURE — 83690 ASSAY OF LIPASE: CPT

## 2022-04-23 PROCEDURE — 85025 COMPLETE CBC W/AUTO DIFF WBC: CPT

## 2022-04-23 PROCEDURE — 83735 ASSAY OF MAGNESIUM: CPT

## 2022-04-23 PROCEDURE — 81001 URINALYSIS AUTO W/SCOPE: CPT

## 2022-04-23 PROCEDURE — 94640 AIRWAY INHALATION TREATMENT: CPT

## 2022-04-23 PROCEDURE — 76700 US EXAM ABDOM COMPLETE: CPT

## 2022-04-23 PROCEDURE — 80053 COMPREHEN METABOLIC PANEL: CPT

## 2022-04-23 PROCEDURE — 82948 REAGENT STRIP/BLOOD GLUCOSE: CPT

## 2022-04-23 PROCEDURE — 81025 URINE PREGNANCY TEST: CPT

## 2022-04-23 PROCEDURE — 74177 CT ABD & PELVIS W/CONTRAST: CPT

## 2022-04-23 PROCEDURE — 78226 HEPATOBILIARY SYSTEM IMAGING: CPT

## 2022-04-24 VITALS — DIASTOLIC BLOOD PRESSURE: 86 MMHG | SYSTOLIC BLOOD PRESSURE: 155 MMHG

## 2022-04-24 VITALS — DIASTOLIC BLOOD PRESSURE: 88 MMHG | SYSTOLIC BLOOD PRESSURE: 133 MMHG

## 2022-04-24 VITALS — SYSTOLIC BLOOD PRESSURE: 139 MMHG | DIASTOLIC BLOOD PRESSURE: 95 MMHG

## 2022-04-24 VITALS — SYSTOLIC BLOOD PRESSURE: 125 MMHG | DIASTOLIC BLOOD PRESSURE: 69 MMHG

## 2022-04-24 LAB
LIPASE SERPL-CCNC: 116 U/L (ref 73–393)
POTASSIUM SERPL-SCNC: 3.6 MMOL/L (ref 3.5–5.1)

## 2022-04-24 PROCEDURE — BW211ZZ COMPUTERIZED TOMOGRAPHY (CT SCAN) OF ABDOMEN AND PELVIS USING LOW OSMOLAR CONTRAST: ICD-10-PCS

## 2022-04-24 RX ADMIN — PANTOPRAZOLE SODIUM SCH MLS/HR: 40 INJECTION, POWDER, FOR SOLUTION INTRAVENOUS at 23:51

## 2022-04-24 RX ADMIN — HEPARIN SODIUM SCH UNIT: 5000 INJECTION, SOLUTION INTRAVENOUS; SUBCUTANEOUS at 20:00

## 2022-04-24 RX ADMIN — SODIUM CHLORIDE SCH MLS/HR: 9 INJECTION INTRAMUSCULAR; INTRAVENOUS; SUBCUTANEOUS at 09:15

## 2022-04-24 RX ADMIN — SODIUM CHLORIDE SCH MLS/HR: 9 INJECTION INTRAMUSCULAR; INTRAVENOUS; SUBCUTANEOUS at 17:02

## 2022-04-24 RX ADMIN — HEPARIN SODIUM SCH UNIT: 5000 INJECTION, SOLUTION INTRAVENOUS; SUBCUTANEOUS at 08:00

## 2022-04-24 RX ADMIN — PANTOPRAZOLE SODIUM SCH MLS/HR: 40 INJECTION, POWDER, FOR SOLUTION INTRAVENOUS at 17:41

## 2022-04-24 RX ADMIN — SODIUM CHLORIDE SCH MLS/HR: 9 INJECTION INTRAMUSCULAR; INTRAVENOUS; SUBCUTANEOUS at 01:15

## 2022-04-24 NOTE — NUR
Problems reprioritized. Patient report given, questions answered & plan of care reviewed 
with SILVIANO Akers.

## 2022-04-25 VITALS — SYSTOLIC BLOOD PRESSURE: 122 MMHG | DIASTOLIC BLOOD PRESSURE: 54 MMHG

## 2022-04-25 VITALS — DIASTOLIC BLOOD PRESSURE: 86 MMHG | SYSTOLIC BLOOD PRESSURE: 151 MMHG

## 2022-04-25 VITALS — SYSTOLIC BLOOD PRESSURE: 146 MMHG | DIASTOLIC BLOOD PRESSURE: 95 MMHG

## 2022-04-25 VITALS — SYSTOLIC BLOOD PRESSURE: 134 MMHG | DIASTOLIC BLOOD PRESSURE: 96 MMHG

## 2022-04-25 VITALS — DIASTOLIC BLOOD PRESSURE: 95 MMHG | SYSTOLIC BLOOD PRESSURE: 145 MMHG

## 2022-04-25 VITALS — DIASTOLIC BLOOD PRESSURE: 90 MMHG | SYSTOLIC BLOOD PRESSURE: 144 MMHG

## 2022-04-25 VITALS — DIASTOLIC BLOOD PRESSURE: 87 MMHG | SYSTOLIC BLOOD PRESSURE: 164 MMHG

## 2022-04-25 VITALS — SYSTOLIC BLOOD PRESSURE: 152 MMHG | DIASTOLIC BLOOD PRESSURE: 89 MMHG

## 2022-04-25 VITALS — DIASTOLIC BLOOD PRESSURE: 95 MMHG | SYSTOLIC BLOOD PRESSURE: 152 MMHG

## 2022-04-25 VITALS — SYSTOLIC BLOOD PRESSURE: 152 MMHG | DIASTOLIC BLOOD PRESSURE: 105 MMHG

## 2022-04-25 VITALS — SYSTOLIC BLOOD PRESSURE: 156 MMHG | DIASTOLIC BLOOD PRESSURE: 105 MMHG

## 2022-04-25 VITALS — DIASTOLIC BLOOD PRESSURE: 95 MMHG | SYSTOLIC BLOOD PRESSURE: 143 MMHG

## 2022-04-25 VITALS — DIASTOLIC BLOOD PRESSURE: 90 MMHG | SYSTOLIC BLOOD PRESSURE: 153 MMHG

## 2022-04-25 VITALS — DIASTOLIC BLOOD PRESSURE: 91 MMHG | SYSTOLIC BLOOD PRESSURE: 136 MMHG

## 2022-04-25 VITALS — SYSTOLIC BLOOD PRESSURE: 151 MMHG | DIASTOLIC BLOOD PRESSURE: 88 MMHG

## 2022-04-25 VITALS — DIASTOLIC BLOOD PRESSURE: 80 MMHG | SYSTOLIC BLOOD PRESSURE: 148 MMHG

## 2022-04-25 VITALS — DIASTOLIC BLOOD PRESSURE: 78 MMHG | SYSTOLIC BLOOD PRESSURE: 129 MMHG

## 2022-04-25 VITALS — SYSTOLIC BLOOD PRESSURE: 106 MMHG | DIASTOLIC BLOOD PRESSURE: 58 MMHG

## 2022-04-25 VITALS — DIASTOLIC BLOOD PRESSURE: 93 MMHG | SYSTOLIC BLOOD PRESSURE: 143 MMHG

## 2022-04-25 VITALS — DIASTOLIC BLOOD PRESSURE: 89 MMHG | SYSTOLIC BLOOD PRESSURE: 138 MMHG

## 2022-04-25 VITALS — SYSTOLIC BLOOD PRESSURE: 136 MMHG | DIASTOLIC BLOOD PRESSURE: 91 MMHG

## 2022-04-25 LAB
ALBUMIN SERPL BCP-MCNC: 3.5 G/DL (ref 3.4–5)
ALBUMIN/GLOB SERPL: 1 {RATIO} (ref 1.1–1.5)
ALP SERPL-CCNC: 58 IU/L (ref 46–116)
ALT SERPL W P-5'-P-CCNC: 70 U/L (ref 12–78)
ANION GAP SERPL CALCULATED.3IONS-SCNC: 9 MMOL/L (ref 8–16)
AST SERPL W P-5'-P-CCNC: 32 U/L (ref 10–37)
BASOPHILS # BLD AUTO: 0 X10'3 (ref 0–0.2)
BASOPHILS NFR BLD AUTO: 0.4 % (ref 0–1)
BILIRUB SERPL-MCNC: 0.8 MG/DL (ref 0.1–1)
BUN SERPL-MCNC: 7 MG/DL (ref 7–18)
BUN/CREAT SERPL: 9 (ref 6.6–38)
CALCIUM SERPL-MCNC: 7.7 MG/DL (ref 8.5–10.1)
CHLORIDE SERPL-SCNC: 104 MMOL/L (ref 99–107)
CO2 SERPL-SCNC: 27.8 MMOL/L (ref 24–32)
CREAT SERPL-MCNC: 0.78 MG/DL (ref 0.4–0.9)
EOSINOPHIL # BLD AUTO: 0.2 X10'3 (ref 0–0.9)
EOSINOPHIL NFR BLD AUTO: 2.2 % (ref 0–6)
ERYTHROCYTE [DISTWIDTH] IN BLOOD BY AUTOMATED COUNT: 13.6 % (ref 11.5–14.5)
GFR SERPL CREATININE-BSD FRML MDRD: 83 ML/MIN
GLUCOSE SERPL-MCNC: 110 MG/DL (ref 70–104)
HCT VFR BLD AUTO: 41.1 % (ref 35–45)
HGB BLD-MCNC: 13.8 G/DL (ref 12–16)
LYMPHOCYTES # BLD AUTO: 2 X10'3 (ref 1.1–4.8)
LYMPHOCYTES NFR BLD AUTO: 21.1 % (ref 21–51)
MAGNESIUM SERPL-MCNC: 2.1 MG/DL (ref 1.5–2.4)
MCH RBC QN AUTO: 29.5 PG (ref 27–31)
MCHC RBC AUTO-ENTMCNC: 33.5 G/DL (ref 33–36.5)
MCV RBC AUTO: 87.9 FL (ref 78–98)
MONOCYTES # BLD AUTO: 1 X10'3 (ref 0–0.9)
MONOCYTES NFR BLD AUTO: 10.2 % (ref 2–12)
NEUTROPHILS # BLD AUTO: 6.3 X10'3 (ref 1.8–7.7)
NEUTROPHILS NFR BLD AUTO: 66.1 % (ref 42–75)
PLATELET # BLD AUTO: 225 X10'3 (ref 140–440)
PMV BLD AUTO: 9.6 FL (ref 7.4–10.4)
POTASSIUM SERPL-SCNC: 3.3 MMOL/L (ref 3.5–5.1)
PROT SERPL-MCNC: 7.1 G/DL (ref 6.4–8.2)
RBC # BLD AUTO: 4.68 X10'6 (ref 4.2–5.6)
SODIUM SERPL-SCNC: 141 MMOL/L (ref 135–145)
WBC # BLD AUTO: 9.5 X10'3 (ref 4.5–11)

## 2022-04-25 PROCEDURE — 0FT44ZZ RESECTION OF GALLBLADDER, PERCUTANEOUS ENDOSCOPIC APPROACH: ICD-10-PCS | Performed by: SURGERY

## 2022-04-25 PROCEDURE — CF1C1ZZ PLANAR NUCLEAR MEDICINE IMAGING OF HEPATOBILIARY SYSTEM, ALL USING TECHNETIUM 99M (TC-99M): ICD-10-PCS

## 2022-04-25 PROCEDURE — 8E0W4CZ ROBOTIC ASSISTED PROCEDURE OF TRUNK REGION, PERCUTANEOUS ENDOSCOPIC APPROACH: ICD-10-PCS | Performed by: SURGERY

## 2022-04-25 RX ADMIN — SODIUM CHLORIDE SCH MLS/HR: 9 INJECTION INTRAMUSCULAR; INTRAVENOUS; SUBCUTANEOUS at 10:19

## 2022-04-25 RX ADMIN — HEPARIN SODIUM SCH UNIT: 5000 INJECTION, SOLUTION INTRAVENOUS; SUBCUTANEOUS at 20:00

## 2022-04-25 RX ADMIN — SODIUM CHLORIDE SCH MLS/HR: 9 INJECTION INTRAMUSCULAR; INTRAVENOUS; SUBCUTANEOUS at 16:39

## 2022-04-25 RX ADMIN — POTASSIUM CHLORIDE PRN MEQ: 1500 TABLET, EXTENDED RELEASE ORAL at 12:04

## 2022-04-25 RX ADMIN — TAZOBACTAM SODIUM AND PIPERACILLIN SODIUM SCH MLS/HR: 500; 4 INJECTION, SOLUTION INTRAVENOUS at 16:15

## 2022-04-25 RX ADMIN — PANTOPRAZOLE SODIUM SCH MLS/HR: 40 INJECTION, POWDER, FOR SOLUTION INTRAVENOUS at 20:00

## 2022-04-25 RX ADMIN — SODIUM CHLORIDE SCH MLS/HR: 9 INJECTION INTRAMUSCULAR; INTRAVENOUS; SUBCUTANEOUS at 09:15

## 2022-04-25 RX ADMIN — POTASSIUM CHLORIDE PRN MEQ: 1500 TABLET, EXTENDED RELEASE ORAL at 16:38

## 2022-04-25 RX ADMIN — PANTOPRAZOLE SODIUM SCH MLS/HR: 40 INJECTION, POWDER, FOR SOLUTION INTRAVENOUS at 10:15

## 2022-04-25 RX ADMIN — HEPARIN SODIUM SCH UNIT: 5000 INJECTION, SOLUTION INTRAVENOUS; SUBCUTANEOUS at 08:00

## 2022-04-25 NOTE — NUR
Patient in room ORTHO 4014. I have received report from JANNIE   and had the opportunity to 
ask questions and assume patient care. PT DOWN TO OR

## 2022-04-25 NOTE — NUR
Patient in room ORTHO 4014. I have received report from  Delphine SHORE  and had the opportunity 
to ask questions and assume patient care.

## 2022-04-25 NOTE — NUR
PT AWAKE AND PAINFUL WITH MOVEMENT, BUT TOLERATING WELL, VSS, ABLE TO BELCH AT TIMES TO 
RELIEVE SOME PRESSURE, EDUCATED ON SPLINTING ABD WITH PILLOW, PT USED BED PAN BUT HAD LG 
INCONTINENCE PRIOR-TOTAL BED CHANGE DONE, PT TOLERATED WELL WITH ROLLING.

## 2022-04-25 NOTE — NUR
REPORT CALLED TO JOSE RN ON ORTHO FLOOR-ALL QUESTIONS ANSWERED, PT VSS, PAIN TOLERABLE, NO 
CHANGES IN ASSESSMENT, 5 LAP SITES-CDI, LR RUNNING AT 100ML/HR, PT TOLERATING ICE CHIPS, 
S.O. ACCOMPANIED PT BACK TO RM 4014B, PRIMARY RN IN ROOM TO RECEIVE PT.

## 2022-04-25 NOTE — NUR
Problems reprioritized. Patient report given, questions answered & plan of care reviewed 
with Silva SHORE.

## 2022-04-25 NOTE — NUR
Problems reprioritized. Patient report given, questions answered & plan of care reviewed 
with EMILY SHORE.

## 2022-04-25 NOTE — NUR
PT ARRIVED TO RR VIA BED ACCOMPANIED BY DR PATTON, ANESTHESIA REPORT GIVEN, VSS, DR 
REQUESTED DUONEB TX-RT PAGED, PT STILL SLEEPY, DENIES PAIN, LAP SITES X 5 - CDI, SCDS ON, 
20G PIV'S X 2 TO BILAT UE, RIGHT ONE BEING USED TO INFUSE LR CURRENTLY.

## 2022-04-25 NOTE — NUR
Patient report called to Vee in OR. BG 77, 2 pieces of jewlery cant be removed. Also, 
Patient wants MD to be aware family states they have a history of women in family have 
stopped breathing in surgery. Dr Gunter aware please pass on to anesthsiologist.

## 2022-04-26 VITALS — SYSTOLIC BLOOD PRESSURE: 152 MMHG | DIASTOLIC BLOOD PRESSURE: 91 MMHG

## 2022-04-26 VITALS — DIASTOLIC BLOOD PRESSURE: 76 MMHG | SYSTOLIC BLOOD PRESSURE: 146 MMHG

## 2022-04-26 VITALS — SYSTOLIC BLOOD PRESSURE: 154 MMHG | DIASTOLIC BLOOD PRESSURE: 100 MMHG

## 2022-04-26 VITALS — DIASTOLIC BLOOD PRESSURE: 85 MMHG | SYSTOLIC BLOOD PRESSURE: 130 MMHG

## 2022-04-26 VITALS — DIASTOLIC BLOOD PRESSURE: 80 MMHG | SYSTOLIC BLOOD PRESSURE: 128 MMHG

## 2022-04-26 LAB
ALBUMIN SERPL BCP-MCNC: 3.7 G/DL (ref 3.4–5)
ALBUMIN/GLOB SERPL: 0.9 {RATIO} (ref 1.1–1.5)
ALP SERPL-CCNC: 66 IU/L (ref 46–116)
ALT SERPL W P-5'-P-CCNC: 89 U/L (ref 12–78)
ANION GAP SERPL CALCULATED.3IONS-SCNC: 11 MMOL/L (ref 8–16)
AST SERPL W P-5'-P-CCNC: 51 U/L (ref 10–37)
BASOPHILS # BLD AUTO: 0 X10'3 (ref 0–0.2)
BASOPHILS NFR BLD AUTO: 0.1 % (ref 0–1)
BILIRUB SERPL-MCNC: 0.7 MG/DL (ref 0.1–1)
BUN SERPL-MCNC: 6 MG/DL (ref 7–18)
BUN/CREAT SERPL: 7.9 (ref 6.6–38)
CALCIUM SERPL-MCNC: 8.5 MG/DL (ref 8.5–10.1)
CHLORIDE SERPL-SCNC: 101 MMOL/L (ref 99–107)
CO2 SERPL-SCNC: 25 MMOL/L (ref 24–32)
CREAT SERPL-MCNC: 0.76 MG/DL (ref 0.4–0.9)
EOSINOPHIL # BLD AUTO: 0 X10'3 (ref 0–0.9)
EOSINOPHIL NFR BLD AUTO: 0 % (ref 0–6)
ERYTHROCYTE [DISTWIDTH] IN BLOOD BY AUTOMATED COUNT: 13.4 % (ref 11.5–14.5)
GFR SERPL CREATININE-BSD FRML MDRD: 86 ML/MIN
GLUCOSE SERPL-MCNC: 142 MG/DL (ref 70–104)
HCT VFR BLD AUTO: 43.3 % (ref 35–45)
HGB BLD-MCNC: 14.5 G/DL (ref 12–16)
LYMPHOCYTES # BLD AUTO: 0.6 X10'3 (ref 1.1–4.8)
LYMPHOCYTES NFR BLD AUTO: 4.7 % (ref 21–51)
MCH RBC QN AUTO: 29.7 PG (ref 27–31)
MCHC RBC AUTO-ENTMCNC: 33.4 G/DL (ref 33–36.5)
MCV RBC AUTO: 88.7 FL (ref 78–98)
MONOCYTES # BLD AUTO: 0.3 X10'3 (ref 0–0.9)
MONOCYTES NFR BLD AUTO: 2.1 % (ref 2–12)
NEUTROPHILS # BLD AUTO: 12.4 X10'3 (ref 1.8–7.7)
NEUTROPHILS NFR BLD AUTO: 93.1 % (ref 42–75)
PLATELET # BLD AUTO: 247 X10'3 (ref 140–440)
PMV BLD AUTO: 9.6 FL (ref 7.4–10.4)
POTASSIUM SERPL-SCNC: 4 MMOL/L (ref 3.5–5.1)
PROT SERPL-MCNC: 7.9 G/DL (ref 6.4–8.2)
RBC # BLD AUTO: 4.88 X10'6 (ref 4.2–5.6)
SODIUM SERPL-SCNC: 137 MMOL/L (ref 135–145)
WBC # BLD AUTO: 13.3 X10'3 (ref 4.5–11)

## 2022-04-26 RX ADMIN — PANTOPRAZOLE SODIUM SCH MLS/HR: 40 INJECTION, POWDER, FOR SOLUTION INTRAVENOUS at 09:16

## 2022-04-26 RX ADMIN — SODIUM CHLORIDE SCH MLS/HR: 9 INJECTION INTRAMUSCULAR; INTRAVENOUS; SUBCUTANEOUS at 09:17

## 2022-04-26 RX ADMIN — TAZOBACTAM SODIUM AND PIPERACILLIN SODIUM SCH MLS/HR: 500; 4 INJECTION, SOLUTION INTRAVENOUS at 09:36

## 2022-04-26 RX ADMIN — SODIUM CHLORIDE SCH MLS/HR: 9 INJECTION INTRAMUSCULAR; INTRAVENOUS; SUBCUTANEOUS at 00:07

## 2022-04-26 RX ADMIN — TAZOBACTAM SODIUM AND PIPERACILLIN SODIUM SCH MLS/HR: 500; 4 INJECTION, SOLUTION INTRAVENOUS at 01:21

## 2022-04-26 RX ADMIN — HEPARIN SODIUM SCH UNIT: 5000 INJECTION, SOLUTION INTRAVENOUS; SUBCUTANEOUS at 08:14

## 2022-04-26 RX ADMIN — CEFOXITIN SCH MLS/HR: 1 INJECTION, POWDER, FOR SOLUTION INTRAVENOUS at 00:49

## 2022-04-26 RX ADMIN — CEFOXITIN SCH MLS/HR: 1 INJECTION, POWDER, FOR SOLUTION INTRAVENOUS at 08:14

## 2022-04-26 NOTE — NUR
PT DISCHARGED IN STABLE CONDITION. LEFT FACILITY IN PRIVATE VEHICLE WITH MOM. IV DC CANULA 
INTACT. FOLLOW UP INSTRUCTIONS GIVEN, ALL QUESTIONS ANSWERED. ALL BELONGINGS IN HAND. 

-------------------------------------------------------------------------------

Addendum: 04/26/22 at 1305 by Jackeline Bansal RN

-------------------------------------------------------------------------------

Amended: Links added.

## 2024-10-21 NOTE — NUR
Patient sitting on gurney, reports left jaw pain 8/10. I will continue to 
monitor. What Type Of Note Output Would You Prefer (Optional)?: Standard Output Hpi Title: Evaluation of Skin Lesions Additional History: No concerning spots